# Patient Record
Sex: FEMALE
[De-identification: names, ages, dates, MRNs, and addresses within clinical notes are randomized per-mention and may not be internally consistent; named-entity substitution may affect disease eponyms.]

---

## 2018-05-20 ENCOUNTER — HOSPITAL ENCOUNTER (INPATIENT)
Dept: HOSPITAL 42 - ED | Age: 83
LOS: 4 days | Discharge: HOME | DRG: 194 | End: 2018-05-24
Attending: INTERNAL MEDICINE | Admitting: INTERNAL MEDICINE
Payer: MEDICARE

## 2018-05-20 VITALS — BODY MASS INDEX: 21.2 KG/M2

## 2018-05-20 DIAGNOSIS — I08.1: ICD-10-CM

## 2018-05-20 DIAGNOSIS — Z98.49: ICD-10-CM

## 2018-05-20 DIAGNOSIS — R40.2412: ICD-10-CM

## 2018-05-20 DIAGNOSIS — I49.3: ICD-10-CM

## 2018-05-20 DIAGNOSIS — H81.10: ICD-10-CM

## 2018-05-20 DIAGNOSIS — J44.0: ICD-10-CM

## 2018-05-20 DIAGNOSIS — E87.6: ICD-10-CM

## 2018-05-20 DIAGNOSIS — I10: ICD-10-CM

## 2018-05-20 DIAGNOSIS — M06.9: ICD-10-CM

## 2018-05-20 DIAGNOSIS — J18.9: Primary | ICD-10-CM

## 2018-05-20 DIAGNOSIS — I65.23: ICD-10-CM

## 2018-05-20 DIAGNOSIS — Z87.891: ICD-10-CM

## 2018-05-20 DIAGNOSIS — I27.20: ICD-10-CM

## 2018-05-20 LAB
ALBUMIN SERPL-MCNC: 3.8 G/DL (ref 3–4.8)
ALBUMIN/GLOB SERPL: 1.3 {RATIO} (ref 1.1–1.8)
ALT SERPL-CCNC: 50 U/L (ref 7–56)
APPEARANCE UR: CLEAR
AST SERPL-CCNC: 45 U/L (ref 14–36)
BASOPHILS # BLD AUTO: 0.03 K/MM3 (ref 0–2)
BASOPHILS NFR BLD: 0.3 % (ref 0–3)
BILIRUB UR-MCNC: NEGATIVE MG/DL
BNP SERPL-MCNC: 203 PG/ML (ref 0–450)
BUN SERPL-MCNC: 17 MG/DL (ref 7–21)
CALCIUM SERPL-MCNC: 8.6 MG/DL (ref 8.4–10.5)
COLOR UR: YELLOW
EOSINOPHIL # BLD: 0 10*3/UL (ref 0–0.7)
EOSINOPHIL NFR BLD: 0.4 % (ref 1.5–5)
ERYTHROCYTE [DISTWIDTH] IN BLOOD BY AUTOMATED COUNT: 14.4 % (ref 11.5–14.5)
GFR NON-AFRICAN AMERICAN: > 60
GLUCOSE UR STRIP-MCNC: NEGATIVE MG/DL
GRANULOCYTES # BLD: 6.88 10*3/UL (ref 1.4–6.5)
GRANULOCYTES NFR BLD: 75.6 % (ref 50–68)
HGB BLD-MCNC: 11.6 G/DL (ref 12–16)
LEUKOCYTE ESTERASE UR-ACNC: NEGATIVE LEU/UL
LIPASE SERPL-CCNC: < 10 U/L (ref 23–300)
LYMPHOCYTES # BLD: 1.4 10*3/UL (ref 1.2–3.4)
LYMPHOCYTES NFR BLD AUTO: 14.8 % (ref 22–35)
MCH RBC QN AUTO: 29.8 PG (ref 25–35)
MCHC RBC AUTO-ENTMCNC: 32.5 G/DL (ref 31–37)
MCV RBC AUTO: 91.8 FL (ref 80–105)
MONOCYTES # BLD AUTO: 0.8 10*3/UL (ref 0.1–0.6)
MONOCYTES NFR BLD: 8.9 % (ref 1–6)
PH UR STRIP: 8 [PH] (ref 4.7–8)
PLATELET # BLD: 131 10^3/UL (ref 120–450)
PMV BLD AUTO: 9.9 FL (ref 7–11)
PROT UR STRIP-MCNC: NEGATIVE MG/DL
RBC # BLD AUTO: 3.89 10^6/UL (ref 3.5–6.1)
RBC # UR STRIP: NEGATIVE /UL
SP GR UR STRIP: 1.01 (ref 1–1.03)
TROPONIN I SERPL-MCNC: 0.02 NG/ML
UROBILINOGEN UR STRIP-ACNC: 0.2 E.U./DL
WBC # BLD AUTO: 9.1 10^3/UL (ref 4.5–11)

## 2018-05-20 NOTE — ED PDOC
Arrival/HPI





- General


Chief Complaint: Dizziness/Lightheaded


Time Seen by Provider: 05/20/18 20:23


Historian: Patient





- History of Present Illness


Narrative History of Present Illness (Text): 





05/20/18 21:19


83-year-old female presents today with nausea that started at noon. Patient 

denies chest pain or shortness of breath. Denies fevers or chills. Patient 

denies any urinary symptoms. Patient denies abdominal pain. Patient denies 

headache but states she is feeling lightheaded. No medications have been taken 

for nausea at home. Patient denies chest pain or shortness of breath. Patient 

denies sick contacts. Patient states 2 days ago she developed nasal congestion 

and a feeling of postnasal drip and states she has slight cough from the post 

nasal drip. 


Time/Duration: Other (12 noon today)


Symptom Onset: Sudden


Symptom Course: Unchanged





Past Medical History





- Provider Review


Nursing Documentation Reviewed: Yes





- Travel History


Have you recently traveled outside [place] w/in the past 3 mons?: No





- Infectious Disease


Hx of Infectious Diseases: None





- Reproductive


Menopause: Yes





- Cardiac


Hx Pacemaker: No





- Neurological


Hx Paralysis: No





- Hematological/Oncological


Hx Blood Transfusions: No





- Musculoskeletal/Rheumatological


Hx Musculoskeletal Disorders: Yes





- Psychiatric


Hx Substance Use: No





- Anesthesia


Hx Anesthesia Reactions: No


Hx Malignant Hyperthermia: No





Family/Social History





- Physician Review


Nursing Documentation Reviewed: Yes


Family/Social History: Unknown Family HX


Smoking Status: Unknown If Ever Smoked


Hx Alcohol Use: Yes (SOCIAL WINE)


Hx Substance Use: No





Allergies/Home Meds


Allergies/Adverse Reactions: 


Allergies





No Known Allergies Allergy (Verified 03/23/15 12:56)


 








Home Medications: 


 Home Meds











 Medication  Instructions  Recorded  Confirmed


 


Adalimumab [Humira] 40 mg SC Q2W 03/23/15 05/20/18


 


Celecoxib [celeBREX] 200 mg PO DAILY 03/23/15 05/20/18


 


Estradiol [Estradiol Transdermal 1 patch TD SAT 03/23/15 05/20/18





System]   


 


Leflunomide [Arava] 20 mg PO DAILY 05/20/18 05/20/18


 


Prednisone [Kaden] 5 mg PO DAILY 05/20/18 05/20/18














Review of Systems





- Review of Systems


Constitutional: absent: Fevers


ENT: Sinus Congestion.  absent: Sore Throat


Respiratory: absent: SOB, Cough


Cardiovascular: absent: Chest Pain, Palpitations


Gastrointestinal: Nausea.  absent: Abdominal Pain, Constipation, Diarrhea, 

Vomiting


Genitourinary Female: absent: Dysuria, Frequency, Hematuria


Musculoskeletal: absent: Arthralgias, Back Pain


Skin: absent: Rash, Pruritis


Neurological: Dizziness.  absent: Headache


Psychiatric: absent: Anxiety, Depression





Physical Exam


Vital Signs Reviewed: Yes


Vital Signs











  Temp Pulse Resp BP Pulse Ox


 


 05/20/18 23:00   78  18  142/75  96


 


 05/20/18 21:50  100.6 F H    


 


 05/20/18 21:34  100.6 F H    


 


 05/20/18 20:34  98.8 F  84  17  155/72 H  96











Temperature: Afebrile


Blood Pressure: Normal


Pulse: Regular


Respiratory Rate: Normal


Appearance: Positive for: Well-Appearing, Non-Toxic, Comfortable


Pain Distress: None


Mental Status: Positive for: Alert and Oriented X 3





- Systems Exam


Head: Present: Atraumatic


Pupils: Present: PERRL


Extroacular Muscles: Present: EOMI


Conjunctiva: Present: Normal


Ears: Present: Normal, NORMAL TM


Mouth: Present: Moist Mucous Membranes


Pharnyx: Present: Normal.  No: ERYTHEMA, EXUDATE


Nose (External): Present: Atraumatic


Neck: Present: Normal Range of Motion, Trachea Midline


Respiratory/Chest: Present: Good Air Exchange, Rhonchi.  No: Clear to 

Auscultation, Respiratory Distress, Accessory Muscle Use, Retracting


Cardiovascular: Present: Regular Rate and Rhythm, Normal S1, S2.  No: Murmurs


Abdomen: No: Tenderness, Distention, Peritoneal Signs, Rebound, Guarding


Back: Present: Normal Inspection.  No: Midline Tenderness, Paraspinal Tenderness


Upper Extremity: Present: Normal ROM


Lower Extremity: Present: Normal ROM


Neurological: Present: GCS=15


Skin: Present: Warm, Dry, Normal Color.  No: Rashes


Psychiatric: Present: Alert, Oriented x 3





Medical Decision Making


ED Course and Treatment: 





05/20/18 21:28


pt with nausea since noon today. pt with cough x 2 days. 











cbc; wnl


cmp; wnl 





trop: 0.02





ekg; NSR at 86b/m no st elevations. normal axis, normal intervals. 





cxr: wnl








CT of head;  FINDINGS:


Brain: Unremarkable. No hemorrhage. No significant white matter disease. No 

edema.


Ventricles: Unremarkable. No ventriculomegaly.


Bones/joints: Unremarkable. No acute fracture.


Soft tissues: Unremarkable.


Sinuses: Unremarkable as visualized. No acute sinusitis.


Mastoid air cells: Unremarkable as visualized. No mastoid effusion.


IMPRESSION:


No evidence of an acute intracranial abnormality








rectal temp; 100.6





tylenol 975mg po 








pt reassessment; pt feeling better after zofran. 











pt reassessment; nausea has returned; addition zofran given. 





blood cultures and urine cultures pending. 





pt with low grade fever in er; with cough;  will start rocephin and zithromax 

for PNA. 











case discussed with Dr. Bello;   will Admit observational status to Tele for 

nausea, r/o acs cough  r/o PNA


pts cardiologist is dr. sharma. 











impression; nausea, dizziness, cough 


Admit observational status to tele; Dr. bello





Reassessment Condition: Re-examined, Improved





- Lab Interpretations


Lab Results: 








 05/20/18 20:40 





 05/20/18 20:40 





 Lab Results





05/20/18 22:20: Urine Color Yellow, Urine Appearance Clear, Urine pH 8.0, Ur 

Specific Gravity 1.015, Urine Protein Negative, Urine Glucose (UA) Negative, 

Urine Ketones 15 H, Urine Blood Negative, Urine Nitrate Negative, Urine 

Bilirubin Negative, Urine Urobilinogen 0.2, Ur Leukocyte Esterase Negative


05/20/18 20:40: WBC 9.1  D, RBC 3.89, Hgb 11.6 L, Hct 35.7 L, MCV 91.8  D, MCH 

29.8, MCHC 32.5, RDW 14.4, Plt Count 131, MPV 9.9, Gran % 75.6 H, Lymph % (Auto

) 14.8 L, Mono % (Auto) 8.9 H, Eos % (Auto) 0.4 L, Baso % (Auto) 0.3, Gran # 

6.88 H, Lymph # (Auto) 1.4, Mono # (Auto) 0.8 H, Eos # (Auto) 0.0, Baso # (Auto

) 0.03


05/20/18 20:40: Sodium 137, Potassium 3.5 L, Chloride 101, Carbon Dioxide 27, 

Anion Gap 13, BUN 17, Creatinine 0.7, Est GFR (African Amer) > 60, Est GFR (Non-

Af Amer) > 60, Random Glucose 107, Calcium 8.6, Total Bilirubin 0.5, AST 45 H, 

ALT 50, Alkaline Phosphatase 73, Lactate Dehydrogenase 550, Total Creatine 

Kinase 87, Troponin I 0.02, NT-Pro-B Natriuret Pep 203, Total Protein 6.8, 

Albumin 3.8, Globulin 3.0, Albumin/Globulin Ratio 1.3, Lipase < 10 L











- RAD Interpretation


Radiology Orders: 








05/20/18 20:30


CHEST PORTABLE [RAD] Stat 





05/20/18 20:31


HEAD W/O CONTRAST [CT] Stat 














- Medication Orders


Current Medication Orders: 











Discontinued Medications





Acetaminophen (Tylenol 325mg Tab)  975 mg PO STAT STA


   Stop: 05/20/18 21:36


   Last Admin: 05/20/18 21:50  Dose: 975 mg





MAR Pain/Vitals


 Document     05/20/18 21:50  JOL  (Rec: 05/20/18 21:52  JOL  [id number])


     Pain Reassessment


      Is This A Pain ReAssessment?               No


     Sleep


      Is patient sleeping during reassessment?   No


     Presence of Pain


      Presence of Pain                           No


     Vitals


      Temperature (97.6 F-99.6 F)                100.6 F


      Temperature Source                         Rectal





Aspirin (Aspirin)  325 mg PO STAT STA


   Stop: 05/20/18 21:55


   Last Admin: 05/20/18 22:23  Dose: 325 mg





Sodium Chloride (Sodium Chloride 0.9%)  500 mls @ 999 mls/hr IV .Q31M STA


   Stop: 05/20/18 21:00


   Last Admin: 05/20/18 20:44  Dose: 999 mls/hr





eMAR Start Stop


 Document     05/20/18 20:44  JOL  (Rec: 05/20/18 20:44  JOL  [id number])


     Intravenous Solution


      Start Date                                 05/20/18


      Start Time                                 20:44


      End Date                                   05/20/18


      End time                                   21:14


      Total Infusion Time                        30





Ceftriaxone Sodium (Rocephin 1 Gram Ivpb)  1 gm in 100 mls @ 200 mls/hr IVPB 

STAT STA


   PRN Reason: Protocol


   Stop: 05/20/18 22:21


   Last Admin: 05/20/18 22:22  Dose: 200 mls/hr





eMAR Start Stop


 Document     05/20/18 22:22  JOL  (Rec: 05/20/18 22:23  JOL  [id number])


     Intravenous Solution


      Start Date                                 05/20/18


      Start Time                                 22:22


      End Date                                   05/20/18


      End time                                   22:52


      Total Infusion Time                        30





Azithromycin (Zithromax 500mg In Ns)  500 mg in 250 mls @ 167 mls/hr IVPB STAT 

STA


   PRN Reason: Protocol


   Stop: 05/20/18 23:21


   Last Admin: 05/20/18 23:11  Dose: 167 mls/hr





eMAR Start Stop


 Document     05/20/18 23:11  JOL  (Rec: 05/20/18 23:11  JOL  [id number])


     Intravenous Solution


      Start Date                                 05/20/18


      Start Time                                 23:11


      End Date                                   05/21/18


      End time                                   00:41


      Total Infusion Time                        90





Ondansetron HCl (Zofran Inj)  4 mg IVP STAT STA


   Stop: 05/20/18 20:31


   Last Admin: 05/20/18 20:43  Dose: 4 mg





IVP Administration


 Document     05/20/18 20:43  JOL  (Rec: 05/20/18 20:44  JOL  [id number])


     Charges for Administration


      # of IVP Administrations                   1





Ondansetron HCl (Zofran Inj)  4 mg IVP STAT STA


   Stop: 05/20/18 23:18


   Last Admin: 05/20/18 23:33  Dose: 4 mg





IVP Administration


 Document     05/20/18 23:33  JOL  (Rec: 05/20/18 23:33  JOL  [id number])


     Charges for Administration


      # of IVP Administrations                   1














Disposition/Present on Arrival





- Present on Arrival


Any Indicators Present on Arrival: No


History of DVT/PE: No


History of Uncontrolled Diabetes: No


Urinary Catheter: No


History of Decub. Ulcer: No


History Surgical Site Infection Following: None





- Disposition


Have Diagnosis and Disposition been Completed?: Yes


Diagnosis: 


 Nausea, Dizziness, Cough





Disposition: HOSPITALIZED


Disposition Time: 00:01


Patient Plan: Observation


Patient Problems: 


 Current Active Problems











Problem Status Onset


 


Nausea Acute  











Condition: FAIR


Referrals: 


Deion Bello MD [Primary Care Provider] - Follow up with primary


Forms:  [facility] (English)

## 2018-05-20 NOTE — CT
EXAM:

  CT Head Without Intravenous Contrast



CLINICAL HISTORY:

  83 years old, female; Signs and symptoms; Dizziness; Additional info: 

Dizziness, nausea



TECHNIQUE:

  Axial computed tomography images of the head/brain without intravenous 

contrast.  All CT scans at this facility use one or more dose reduction 

techniques, viz.: automated exposure control; ma/kV adjustment per patient size 

(including targeted exams where dose is matched to indication; i.e. head); or 

iterative reconstruction technique.

  Coronal and sagittal reformatted images were created and reviewed.



COMPARISON:

  No relevant prior studies available.



FINDINGS:

  Brain:  Unremarkable.  No hemorrhage.  No significant white matter disease.  

No edema.

  Ventricles:  Unremarkable.  No ventriculomegaly.

  Bones/joints:  Unremarkable.  No acute fracture.

  Soft tissues:  Unremarkable.

  Sinuses:  Unremarkable as visualized.  No acute sinusitis.

  Mastoid air cells:  Unremarkable as visualized.  No mastoid effusion.



IMPRESSION:     

  No evidence of an acute intracranial abnormality.

## 2018-05-21 LAB
BUN SERPL-MCNC: 13 MG/DL (ref 7–21)
CALCIUM SERPL-MCNC: 8.2 MG/DL (ref 8.4–10.5)
GFR NON-AFRICAN AMERICAN: > 60

## 2018-05-21 RX ADMIN — AZITHROMYCIN DIHYDRATE SCH MLS/HR: 500 INJECTION, POWDER, LYOPHILIZED, FOR SOLUTION INTRAVENOUS at 13:41

## 2018-05-21 RX ADMIN — Medication SCH MG: at 19:45

## 2018-05-21 RX ADMIN — CEFTRIAXONE SCH MLS/HR: 1 INJECTION, SOLUTION INTRAVENOUS at 13:40

## 2018-05-21 NOTE — CARD
--------------- APPROVED REPORT --------------





EKG Measurement

Heart [id number]

VT [id number]

[id number]

[id number]

[id number]



<Conclusion>

Normal sinus rhythm

Possible Left atrial enlargement

Borderline ECG

## 2018-05-21 NOTE — HP
DATE:  05/21/2018



HISTORY OF PRESENT ILLNESS:  The patient is 83-year-old, states she had eye

surgery cataract done on her right eye on Tuesday, did well on Wednesday

and Thursday, but Friday she started to have upper respiratory symptoms,

had cough, congestion, started to have runny stuffy nose, headache, ear

pressure, and yesterday evening when she got up, she was unable to stand up

or walk, so she called her daughter who called ambulance and she was

brought to emergency room, did have fever at home, denies any nausea or

vomiting.  No history of hemoptysis.  No hematemesis.



PAST MEDICAL HISTORY:  Significant for:

1.  Hypertension.

2.  Rheumatoid arthritis.

3.  Recent cataract extraction done.



ALLERGIES:  NOT ALLERGIC TO ANY MEDICATIONS.



MEDICATION AT HOME:  The patient is on oxybutynin 5 mg daily, Breo Ellipta,

Celebrex 200 daily, Humira 40 mg subcu every other week.  She is on

estradiol or transdermal patch 5 mg of prednisone daily and _____ mg daily.



SOCIAL HISTORY:  She is single, lives with her daughter, used to be a heavy

smoker in remote past.



PHYSICAL EXAMINATION

GENERAL:  The patient is awake, alert, oriented, communicative.

VITAL SIGNS:  The patient is afebrile, pulse 79, respirations 18, blood

pressure 142/68.

LUNGS:  Bilateral good airflow.  Few expiratory rhonchi.

HEART:  S1 and S2 audible.

ABDOMEN:  Soft.  Nontender.  No rebound.  No guarding.

NEUROLOGIC:  The patient is awake, alert, oriented, communicative.



LABORATORY EXAM:  WBC is 9.1, hemoglobin 11.6, hematocrit 35.7, platelet of

131.  Chemistry:  Sodium 141, potassium 3.8, chloride 107, CO2 of 27, BUN

13, creatinine 0.7, blood sugar of 98, calcium 8.2.  AST 45.  Two sets of

troponin is negative.  CT scan of the head.  No evidence of acute

intracranial abnormality.  X-ray of chest is unremarkable.



ASSESSMENT:

1.  Upper respiratory symptoms.

2.  Vertigo.

3.  History of hypertension.

4.  Rheumatoid arthritis.

5.  Probably benign positional vertigo.



PLAN:  We will start her on Antivert.  I will order for CT of the chest,

start her on Claritin.  She is on prednisone 5 mg daily for her rheumatoid

arthritis, we will continue that.  Request for physical therapy.  We will

reevaluate in a.m. if the patient feels better, we will make discharge

plans for tomorrow.







__________________________________________

Deion Bello MD



DD:  05/21/2018 12:13:03

DT:  05/21/2018 17:42:18

Job # [id number]

## 2018-05-21 NOTE — US
PROCEDURE:  Bilateral carotid artery duplex ultrasound 



HISTORY:

Carotid stenosis syncope.



PHYSICIAN(S):  Miguel A Ayala MD.



TECHNIQUE:

Duplex sonography and color-flow Doppler were used to evaluate the 

carotid bifurcations and limited segments of the vertebral arteries 

bilaterally.



FINDINGS:

There is mild smooth heterogeneous plaque noted at the carotid 

bifurcations bilaterally. The peak systolic velocity in the proximal 

right internal carotid artery is 147 cm/sec. This corresponds to a 

40-59 percent proximal right ICA stenosis. Normal systolic velocities 

are noted in the proximal right external carotid artery. There is 

antegrade flow in the right vertebral artery.



The peak systolic velocity in the proximal left internal carotid 

artery is 139 cm/sec. This corresponds to a 40-59 percent proximal 

left ICA stenosis. Normal systolic velocities are noted in the 

proximal left external carotid artery. There is antegrade flow in the 

left vertebral artery.



IMPRESSION:

1. 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



2. Antegrade flow in both vertebral arteries.

## 2018-05-21 NOTE — CON
DATE:  05/21/2018



INDICATIONS:  Congestion, cough, nausea, and lightheadedness.



HISTORY OF PRESENT ILLNESS:  This is an 83-year-old woman, known to me, who

was admitted through the emergency room yesterday when she presented with

congestion, predominantly nasal with cough, nausea, and lightheadedness. 

There was some shortness of breath.  There was no chest pain, orthopnea,

PND, syncope, vertigo, palpitation, edema, claudication, fever, chills,

rigor, sweats, hemoptysis, abdominal pain, diarrhea, constipation, melena.



PAST MEDICAL HISTORY:  Notable for rheumatoid arthritis and hypertension.  

There is no cardiac history.  There is a history of mild mitral regurgitation 
and

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  She has had

bunion surgery.  There is no history of rheumatic fever, myocardial infarction, 
angina,

congestive heart failure, arrhythmia.  There is no history of stroke, TIA,

diabetes, hyperlipidemia, or gout.



MEDICATIONS AT THE TIME OF ADMISSION:  Include  Humira, Celebrex, estradiol

transdermal patch, Arava, prednisone, BREO ELLIPTA, vitamin E and vitamin

D.



ALLERGIES:  THERE ARE NO MEDICATION ALLERGIES.



SOCIAL HISTORY:  She lives at home.  She is ambulatory.  She does not smoke

cigarettes.  She drinks alcohol infrequently on social occasions.



FAMILY HISTORY:  Noncontributory.



REVIEW OF SYSTEMS:  A 10-point review of systems is otherwise unremarkable

except as noted above.



PHYSICAL EXAMINATION:

GENERAL:  She is a well-developed woman, lying in bed on telemetry, in no

acute distress.

VITAL SIGNS:  She is in sinus rhythm, PVCs are noted, heart rate 79 beats

per minute.  Temperature max 100.6, currently 99.1.  Blood pressure 142/68,

respirations 18, O2 sat 95% to 97% on room air.

HEENT:  Reveal no neck vein distention, thyromegaly, or carotid bruits. 

Mucous membranes are moist.  Conjunctivae are pink.

NECK:  Supple.

LUNGS:  Lung fields clear.

HEART:  Reveals normal first and second heart sounds.  Soft systolic murmur

at the lower left sternal border.

ABDOMEN:  Soft.  Bowel sounds are present.  No mass, organomegaly,

tenderness, rebound, or guarding.  No CVA tenderness.  No palpable

abdominal aortic aneurysm.

EXTREMITIES:  Reveal no cyanosis, clubbing, or edema.

NEUROLOGIC:  Awake, alert, and oriented.

SKIN:  Warm and dry.  No rash or cellulitis.

PSYCHIATRIC:  Normal as to mood and affect.



LABORATORY AND IMAGING:  Chest x-ray is a portable study, it is not yet

interpreted.  To me, it shows clear lung fields, no evidence of CHF,

infiltrate, or effusion.  EKG shows sinus rhythm, no acute changes.  CT

scan of the head shows no evidence of acute intracranial abnormality.



White count normal, hemoglobin of 11.6, hematocrit 35.7, platelet count

131,000.  Electrolytes normal with a potassium of 3.5.  BUN 17, creatinine

0.7.  Mild elevation of AST is noted, otherwise LFTs unremarkable.  CK 87,

troponin negative x2.  .  Lipase less than 10.  Urinalysis noted.



IMPRESSION:  Sowmya Lawrence is an 83-year-old woman admitted with nasal

congestion, cough, nausea, and lightheadedness, not feeling well, came to

the emergency room, admitted with possible pneumonia, await official chest

x-ray interpretation, low-grade fever.



I will review her old records.  She has gotten antibiotics.  She is

cultured.  She is on IV fluids.  She feels better this morning.  I will

follow along with you.  I will make additional recommendations based on her

clinical course.  We will continue telemetry.  We will check for postural

vital signs.  We will continue her usual medications for now.







__________________________________________

Polo Carmona MD



DD:  05/21/2018 8:53:46

DT:  05/21/2018 16:42:14

Job # [id number]

GREGOR

## 2018-05-21 NOTE — CT
PROCEDURE:  CT Chest without contrast



HISTORY:

cough/fever



COMPARISON:

None.



TECHNIQUE:

Contiguous axial images were obtained through the chest without 

intravenous contrast enhancement. Sagittal and coronal 

reconstructions were performed.







Radiation dose (DLP): 155 mGy-cm. 



This CT exam was performed using one or more of the following dose 

reduction techniques: Automated exposure control, adjustment of the 

mA and/or kV according to patient size, and/or use of iterative 

reconstruction technique.



FINDINGS:



LUNGS:

There is a dense area of consolidation in the posterior left lower 

lobe. This is consistent with pneumonia.



There is a small spiculated scar in the left lung apex. 



MEDIASTINUM:

Unremarkable thoracic aorta. No aneurysm. Normal sized heart. Main 

pulmonary artery unremarkable. No vascular congestion. No 

lymphadenopathy.



PLEURA:

No pleural fluid. No pneumothorax.



BONES:

No fracture. No destructive lesion. 



UPPER ABDOMEN:

Grossly unremarkable.



OTHER FINDINGS:

None.



IMPRESSION:

Dense area of consolidation in the left lower lobe consistent with 

pneumonia.

## 2018-05-21 NOTE — RAD
HISTORY:

dizziness, nausea, weakness  



COMPARISON:

07/06/2017 



FINDINGS:



LUNGS:

No active pulmonary disease.



PLEURA:

No significant pleural effusion identified, no pneumothorax apparent.



CARDIOVASCULAR:

Normal.



OSSEOUS STRUCTURES:

No significant abnormalities.



VISUALIZED UPPER ABDOMEN:

Normal.



OTHER FINDINGS:

None.



IMPRESSION:

No active disease.

## 2018-05-21 NOTE — CP.PCM.PN
Subjective





- Date & Time of Evaluation


Date of Evaluation: 05/21/18


Time of Evaluation: 02:00





- Subjective


Subjective: 





called by nurse pt has frequent pvc on monitor . pt denies complaints has hx of 

copd pul htn,?pneumonia.





Objective





- Vital Signs/Intake and Output


Vital Signs (last 24 hours): 


 











Temp Pulse Resp BP Pulse Ox


 


 99.1 F   79   18   142/68   95 


 


 05/21/18 05:37  05/21/18 05:37  05/21/18 05:37  05/21/18 05:37  05/21/18 05:37








Intake and Output: 


 











 05/20/18 05/21/18





 18:59 06:59


 


Output Total  400


 


Balance  -400














- Constitutional


Appears: No Acute Distress





- Head Exam


Head Exam: NORMOCEPHALIC





- Eye Exam


Eye Exam: Normal appearance


Pupil Exam: PERRL





- ENT Exam


ENT Exam: Mucous Membranes Moist





- Neck Exam


Neck Exam: Full ROM





- Respiratory Exam


Respiratory Exam: Decreased Breath Sounds





- Cardiovascular Exam


Cardiovascular Exam: RRR, +S1, +S2





- GI/Abdominal Exam


GI & Abdominal Exam: Soft





- Rectal Exam


Rectal Exam: Deferred





- Extremities Exam


Extremities Exam: Normal Inspection





- Neurological Exam


Neurological Exam: Alert, Awake, Oriented x3





- Psychiatric Exam


Psychiatric exam: Normal Affect





- Skin


Skin Exam: Normal Color





Assessment and Plan





- Assessment and Plan (Free Text)


Assessment: 





frequent pvc.


hx of copd ,pulmonary htn ?pneumonia.


Plan: 





pt was given xopnex nebs x1 pt improved.

## 2018-05-22 LAB
ALBUMIN SERPL-MCNC: 3.4 G/DL (ref 3–4.8)
ALBUMIN/GLOB SERPL: 1.1 {RATIO} (ref 1.1–1.8)
ALT SERPL-CCNC: 76 U/L (ref 7–56)
AST SERPL-CCNC: 68 U/L (ref 14–36)
BASOPHILS # BLD AUTO: 0.02 K/MM3 (ref 0–2)
BASOPHILS NFR BLD: 0.4 % (ref 0–3)
BUN SERPL-MCNC: 14 MG/DL (ref 7–21)
CALCIUM SERPL-MCNC: 8.2 MG/DL (ref 8.4–10.5)
EOSINOPHIL # BLD: 0.1 10*3/UL (ref 0–0.7)
EOSINOPHIL NFR BLD: 1.5 % (ref 1.5–5)
ERYTHROCYTE [DISTWIDTH] IN BLOOD BY AUTOMATED COUNT: 14.7 % (ref 11.5–14.5)
GFR NON-AFRICAN AMERICAN: > 60
GRANULOCYTES # BLD: 3.28 10*3/UL (ref 1.4–6.5)
GRANULOCYTES NFR BLD: 69.1 % (ref 50–68)
HGB BLD-MCNC: 11.1 G/DL (ref 12–16)
LYMPHOCYTES # BLD: 1.1 10*3/UL (ref 1.2–3.4)
LYMPHOCYTES NFR BLD AUTO: 22.2 % (ref 22–35)
MCH RBC QN AUTO: 30.2 PG (ref 25–35)
MCHC RBC AUTO-ENTMCNC: 32.6 G/DL (ref 31–37)
MCV RBC AUTO: 92.7 FL (ref 80–105)
MONOCYTES # BLD AUTO: 0.3 10*3/UL (ref 0.1–0.6)
MONOCYTES NFR BLD: 6.8 % (ref 1–6)
PLATELET # BLD: 113 10^3/UL (ref 120–450)
PMV BLD AUTO: 9.3 FL (ref 7–11)
RBC # BLD AUTO: 3.68 10^6/UL (ref 3.5–6.1)
WBC # BLD AUTO: 4.7 10^3/UL (ref 4.5–11)

## 2018-05-22 RX ADMIN — Medication SCH MG: at 13:30

## 2018-05-22 RX ADMIN — Medication SCH MG: at 08:05

## 2018-05-22 RX ADMIN — Medication SCH MG: at 20:40

## 2018-05-22 RX ADMIN — AZITHROMYCIN DIHYDRATE SCH MLS/HR: 500 INJECTION, POWDER, LYOPHILIZED, FOR SOLUTION INTRAVENOUS at 10:16

## 2018-05-22 RX ADMIN — POTASSIUM & SODIUM PHOSPHATES POWDER PACK 280-160-250 MG SCH PKT: 280-160-250 PACK at 18:24

## 2018-05-22 RX ADMIN — CEFTRIAXONE SCH MLS/HR: 1 INJECTION, SOLUTION INTRAVENOUS at 09:08

## 2018-05-22 NOTE — PN
DATE:  05/22/2018



SUBJECTIVE:  The patient is 83-year-old, seen and examined, sitting in

chair, still feels sick, has cough, congestion with body aches and pain and

feels chills at time, having cough.  Denies any nausea or vomiting.



PHYSICAL EXAMINATION:

VITAL SIGNS:  She has a temperature of 99.9, pulse 76, ____ 147/81.

LUNGS:  Soft crackle at the right lower lung area and in the left lower

lung area.

HEART:  S1 and S2 audible.

ABDOMEN:  Soft, nontender.  No rebound, no guarding.

NEUROLOGIC:  The patient is awake, alert, and oriented.  Able to

communicate.



LABORATORY DATA:  WBC 12.7, hemoglobin 11, hematocrit 34, platelets 130. 

Chemistry:  Sodium 140, potassium 3.2, chloride 106, CO2 24, BUN 14,

creatinine 0.7, blood sugar 185, phosphorus is 1.6.  Blood cultures and

urine cultures are negative.  She had CT scan of the chest done that shows

dense area of consolidation in the left lower lobe consistent with

pneumonia.



ASSESSMENT:

1.  Community-acquired pneumonia.

2.  Hypokalemia.

3.  Rheumatoid arthritis.

4.  Hypertension.



PLAN:  We will continue the patient on current antibiotic.  I will

discontinue Telemetry.  Supplement potassium.  Currently, she is on

Rocephin and Flagyl.  We will continue that and we will follow up this

patient in the morning.







__________________________________________

Deion Bello MD



DD:  05/22/2018 12:43:29

DT:  05/22/2018 13:52:41

Job # [id number]

## 2018-05-22 NOTE — CP.PCM.PN
Subjective





- Date & Time of Evaluation


Date of Evaluation: 05/22/18


Time of Evaluation: 07:00





- Subjective


Subjective: 


Stable on 2R.  No  CP or SOB. No dizziness now. She did feel flushed earlier 

this AM.





V/S noted. 99.9, RSR at 76 BPM. Normal orthostatic V/S.





PE:





Lungs: clear


cor.: S1S2


Abd.: soft


Ext.: no edema


Neuro.: alert





BC X2 NG at 24 hrs





CT chest noted: LLL infiltrate





Car. U/S: Bilat 40 - 59%











Objective





- Vital Signs/Intake and Output


Vital Signs (last 24 hours): 


 











Temp Pulse Resp BP Pulse Ox


 


 99.9 F H  76   18   164/83 H  94 L


 


 05/22/18 06:00  05/22/18 06:00  05/22/18 06:00  05/22/18 06:00  05/22/18 06:00








Intake and Output: 


 











 05/22/18 05/22/18





 06:59 18:59


 


Intake Total 480 


 


Balance 480 














- Medications


Medications: 


 Current Medications





Home Med (Home Med)  1 unit PO DAILY [facility]


Home Med (Home Med)  0 unit IH DAILY [facility]


Home Med (Home Med)  0 unit IH DAILY [facility]


Home Med (Home Med)  0 unit OD BID [facility]


Ceftriaxone Sodium (Rocephin 1 Gram Ivpb)  1 gm in 100 mls @ 100 mls/hr IVPB 

DAILY [facility]


   PRN Reason: Protocol


   Last Admin: 05/21/18 13:40 Dose:  100 mls/hr


Azithromycin (Zithromax 500mg In Ns)  500 mg in 250 mls @ 167 mls/hr IVPB DAILY 

[facility]


   PRN Reason: Protocol


   Last Admin: 05/21/18 13:41 Dose:  167 mls/hr


Levalbuterol HCl (Xopenex)  1.25 mg IH TIDRESP [facility]


   Last Admin: 05/21/18 19:45 Dose:  1.25 mg


Loratadine (Claritin)  10 mg PO DAILY [facility]


   Last Admin: 05/21/18 13:41 Dose:  10 mg


Meclizine HCl (Antivert)  12.5 mg PO TID [facility]


   Last Admin: 05/21/18 18:39 Dose:  Not Given


Meloxicam (Mobic)  15 mg PO DAILY [facility]


Non-Formulary Medication (Celecoxib [Celebrex])  200 mg PO HS [facility]


   Last Admin: 05/22/18 00:00 Dose:  Not Given


Non-Formulary Medication (Oxybutynin [Oxytrol For Women])  1 each TD Q72 [facility]


Prednisone (Prednisone Tab)  5 mg PO DAILY [facility]


   Last Admin: 05/21/18 10:02 Dose:  5 mg


Promethazine HCl/Dextromethorphan (Phenergan Dm Syrup)  5 ml PO Q6H PRN


   PRN Reason: Cough











- Labs


Labs: 


 





 05/21/18 08:30 











Assessment and Plan





- Assessment and Plan (Free Text)


Assessment: 





Cough, Congestion, Lightheadedness, Fever


LLL pneumonia on CT Chest


HBP


RA


CVD


S/P recent cataract surgery


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





Plan:





AB


OOB as denisse.


ASA 81/day


Out-pt echo planned for next week.

## 2018-05-23 VITALS — OXYGEN SATURATION: 97 %

## 2018-05-23 RX ADMIN — POTASSIUM & SODIUM PHOSPHATES POWDER PACK 280-160-250 MG SCH PKT: 280-160-250 PACK at 10:51

## 2018-05-23 RX ADMIN — Medication SCH MG: at 08:26

## 2018-05-23 RX ADMIN — Medication SCH MG: at 20:35

## 2018-05-23 RX ADMIN — CEFTRIAXONE SCH MLS/HR: 1 INJECTION, SOLUTION INTRAVENOUS at 10:49

## 2018-05-23 RX ADMIN — PROMETHAZINE HYDROCHLORIDE AND DEXTROMETHORPHAN HYDROBROMIDE PRN ML: 15; 6.25 SYRUP ORAL at 23:03

## 2018-05-23 RX ADMIN — AZITHROMYCIN DIHYDRATE SCH MLS/HR: 500 INJECTION, POWDER, LYOPHILIZED, FOR SOLUTION INTRAVENOUS at 10:50

## 2018-05-23 RX ADMIN — Medication SCH MG: at 13:10

## 2018-05-23 RX ADMIN — POTASSIUM & SODIUM PHOSPHATES POWDER PACK 280-160-250 MG SCH PKT: 280-160-250 PACK at 17:38

## 2018-05-23 NOTE — PN
DATE:  05/23/2018



SUBJECTIVE:  Patient is 83 years old, seen and examined.  States she still

feels she has off and on, still having cough with mild shortness of breath.



OBJECTIVE:

VITAL SIGNS:  She is afebrile, pulse 75, respirations 18, blood pressure

128/63.

LUNGS:  Bilateral fair airflow.  Soft crackle at the bases.

HEART:  S1, S2 audible.

ABDOMEN:  Soft, nontender, no rebound, no guarding.

NEUROLOGIC:  She is awake, alert, oriented, communicative.

EXTREMITIES:  Bilateral legs, no edema.



LABORATORY DATA:  Blood cultures and urine cultures are negative.



ASSESSMENT:

1.  Community-acquired pneumonia.

2.  Mild-to-moderate carotid stenosis.

3.  Hypertension.

4.  Rheumatoid arthritis.

5.  Status post near syncope.

6.  Left lower lobe pneumonia.



PLAN:  We will continue patient on current antibiotics.  Continue nebulizer

treatment.  Add some antitussives, and patient is on maintenance medication

also.  I will change her meclizine to p.r.n. and will reevaluate patient in

a.m.  Possible discharge plan in a day or two.







__________________________________________

Deion Bello MD



DD:  05/23/2018 12:30:19

DT:  05/23/2018 14:56:10

Job # [id number]

## 2018-05-23 NOTE — PN
DATE:  05/23/2018



SUBJECTIVE:  The patient is seen lying in bed on telemetry.  She is

comfortable at present time.  She denies any chest pain or lightheadedness.

Her cough is improving.



CURRENT MEDICATIONS:  Include Celebrex, Claritin, Arava, Incruse inhaler,

Breo Ellipta inhaler, prednisone 5 mg daily, Rocephin, Xopenex and

Zithromax.



OBJECTIVE:

GENERAL:  She is an elderly woman, appears comfortable at rest.

VITAL SIGNS:  Her blood pressure is 140/76 with pulse of 82 and sinus

respirations are 14.  She is afebrile.

HEENT:  No JVD.

CHEST:  Bilateral scattered rhonchi.

HEART:  PMI displaced laterally with soft systolic murmur in left sternal

border.

ABDOMEN:  Soft and nontender.  Normoactive bowel sounds.

EXTREMITIES:  No edema.



DIAGNOSTIC DATA:  No blood work pending from this morning.



IMPRESSION:

1.  Left lower lobe pneumonia, clinically improved.

2.  History of hypertension.

3.  History of mitral and tricuspid regurgitation.



RECOMMENDATIONS:  Current medications should be continued at the present

time.  If she has recurrence of vertigo, Antivert can be continued.  No

further cardiac workup appears necessary at this time.   We will be happy

to follow along as needed.





__________________________________________

Oscar Verdugo MD



DD:  05/23/2018 9:23:25

DT:  05/23/2018 9:25:07

Job # [id number]

MTDD

## 2018-05-24 VITALS
TEMPERATURE: 97.5 F | DIASTOLIC BLOOD PRESSURE: 79 MMHG | HEART RATE: 65 BPM | SYSTOLIC BLOOD PRESSURE: 158 MMHG | RESPIRATION RATE: 21 BRPM

## 2018-05-24 LAB
ALBUMIN SERPL-MCNC: 3.3 G/DL (ref 3–4.8)
ALBUMIN/GLOB SERPL: 1.1 {RATIO} (ref 1.1–1.8)
ALT SERPL-CCNC: 68 U/L (ref 7–56)
AST SERPL-CCNC: 53 U/L (ref 14–36)
BUN SERPL-MCNC: 23 MG/DL (ref 7–21)
CALCIUM SERPL-MCNC: 8.4 MG/DL (ref 8.4–10.5)
GFR NON-AFRICAN AMERICAN: > 60

## 2018-05-24 RX ADMIN — CEFTRIAXONE SCH MLS/HR: 1 INJECTION, SOLUTION INTRAVENOUS at 09:56

## 2018-05-24 RX ADMIN — POTASSIUM & SODIUM PHOSPHATES POWDER PACK 280-160-250 MG SCH PKT: 280-160-250 PACK at 09:50

## 2018-05-24 RX ADMIN — Medication SCH: at 13:19

## 2018-05-24 RX ADMIN — PROMETHAZINE HYDROCHLORIDE AND DEXTROMETHORPHAN HYDROBROMIDE PRN ML: 15; 6.25 SYRUP ORAL at 10:53

## 2018-05-24 RX ADMIN — Medication SCH MG: at 07:28

## 2018-05-25 NOTE — DS
HISTORY OF PRESENT ILLNESS:  Patient is 83 years old, who came to emergency

room because of cough, congestion, shortness of breath; felt very weak,

dizzy as if she is going to pass out.  So, daughter called ambulance and

she was brought to the emergency room.  She was found to have left lower

lobe pneumonia, treated with IV antibiotics.  Seems to be doing well, wants

to go home.



PHYSICAL EXAMINATION:

VITAL SIGNS:  She is afebrile, pulse 65, respirations 21, blood pressure

158/79.

LUNGS:  Bilateral fair airflow.  A few expiratory rhonchi with soft crackle

at left lower lung region.

HEART:  S1 and S2 audible.

ABDOMEN:  Soft, nontender, no rebound, no guarding.

NEUROLOGIC:  She is awake, alert, oriented, communicative.



LABORATORY DATA:  Sodium is 141, potassium 4, chloride 108, CO2 of 26, BUN

23, creatinine 0.6, blood sugar of 93.



ASSESSMENT:

1.  Rheumatoid arthritis.

2.  Hypertension.

3.  Recent cataract extraction.



PLAN:  Patient is being discharged home on Zithromax 250 daily for 6 more

days, Vantin 100 mg twice a day for 6 more days and 200 mg twice a day for

6 more days and then she will followup with me in the office in a week and

she will resume her usual nebulizer medication.







__________________________________________

Deion Bello MD



DD:  05/24/2018 12:20:40

DT:  05/24/2018 20:59:32

Job # [id number]